# Patient Record
Sex: FEMALE | Race: WHITE
[De-identification: names, ages, dates, MRNs, and addresses within clinical notes are randomized per-mention and may not be internally consistent; named-entity substitution may affect disease eponyms.]

---

## 2021-02-16 ENCOUNTER — APPOINTMENT (OUTPATIENT)
Dept: PLASTIC SURGERY | Facility: CLINIC | Age: 51
End: 2021-02-16
Payer: SELF-PAY

## 2021-02-16 VITALS
HEART RATE: 74 BPM | TEMPERATURE: 97.9 F | DIASTOLIC BLOOD PRESSURE: 76 MMHG | RESPIRATION RATE: 19 BRPM | OXYGEN SATURATION: 100 % | SYSTOLIC BLOOD PRESSURE: 119 MMHG

## 2021-02-16 VITALS — BODY MASS INDEX: 24.99 KG/M2 | HEIGHT: 65 IN | WEIGHT: 150 LBS

## 2021-02-16 DIAGNOSIS — L98.8 OTHER SPECIFIED DISORDERS OF THE SKIN AND SUBCUTANEOUS TISSUE: ICD-10-CM

## 2021-02-16 PROBLEM — Z00.00 ENCOUNTER FOR PREVENTIVE HEALTH EXAMINATION: Status: ACTIVE | Noted: 2021-02-16

## 2021-02-16 PROCEDURE — 11950 SUBQ NJX FILLING MATRL 1CC/<: CPT | Mod: 59

## 2021-03-02 ENCOUNTER — APPOINTMENT (OUTPATIENT)
Dept: PLASTIC SURGERY | Facility: CLINIC | Age: 51
End: 2021-03-02
Payer: SELF-PAY

## 2021-03-02 DIAGNOSIS — L90.8 OTHER ATROPHIC DISORDERS OF SKIN: ICD-10-CM

## 2021-03-02 PROCEDURE — 11950 SUBQ NJX FILLING MATRL 1CC/<: CPT

## 2021-03-02 NOTE — ASSESSMENT
[FreeTextEntry1] : THOMAS BLANCHARD  was here for procedural Botox and voluma  injection.  She  tolerated the procedure well and will return for next dose in 4-5 months.  Instructions were reviewed. pt also considering photo facials for pigment under eyes \par

## 2021-04-28 PROBLEM — L98.8 FACIAL RHYTIDS: Status: ACTIVE | Noted: 2021-03-02

## 2021-04-28 NOTE — ASSESSMENT
[FreeTextEntry1] : THOMAS BLANCHARD  was here for procedural Botox and voluma  injection.  She  tolerated the procedure well and will return for next dose in 4-5 months.  Instructions were reviewed. Aseptic administration of botox and voluma to indicated areas of the face.  See external sheet for lot and dose information \par

## 2021-04-28 NOTE — PROCEDURE
[FreeTextEntry6] : THOMAS BLANCHARD is complaining of dynamic rhytids of the face and desires botulinum toxin and voluma for temporary reduction in these rhytids.  The risks benefits alternatives limitations and permanent scars were outlined with her . Under aseptic conditions, Botulinum Toxin was administered in the desired area. Please see face sheet for lot , site and dose information. \par \par Please see the scanned face sheet for lot and dose information.\par

## 2022-07-14 ENCOUNTER — NON-APPOINTMENT (OUTPATIENT)
Age: 52
End: 2022-07-14

## 2022-07-19 ENCOUNTER — APPOINTMENT (OUTPATIENT)
Dept: BREAST CENTER | Facility: CLINIC | Age: 52
End: 2022-07-19

## 2022-07-19 VITALS
DIASTOLIC BLOOD PRESSURE: 78 MMHG | BODY MASS INDEX: 24.16 KG/M2 | WEIGHT: 145 LBS | HEART RATE: 66 BPM | SYSTOLIC BLOOD PRESSURE: 113 MMHG | HEIGHT: 65 IN

## 2022-07-19 DIAGNOSIS — Z78.9 OTHER SPECIFIED HEALTH STATUS: ICD-10-CM

## 2022-07-19 DIAGNOSIS — R92.2 INCONCLUSIVE MAMMOGRAM: ICD-10-CM

## 2022-07-19 DIAGNOSIS — Z80.42 FAMILY HISTORY OF MALIGNANT NEOPLASM OF PROSTATE: ICD-10-CM

## 2022-07-19 DIAGNOSIS — Z87.891 PERSONAL HISTORY OF NICOTINE DEPENDENCE: ICD-10-CM

## 2022-07-19 DIAGNOSIS — Z12.31 ENCOUNTER FOR SCREENING MAMMOGRAM FOR MALIGNANT NEOPLASM OF BREAST: ICD-10-CM

## 2022-07-19 DIAGNOSIS — N64.4 MASTODYNIA: ICD-10-CM

## 2022-07-19 DIAGNOSIS — Z80.1 FAMILY HISTORY OF MALIGNANT NEOPLASM OF TRACHEA, BRONCHUS AND LUNG: ICD-10-CM

## 2022-07-19 PROCEDURE — 99204 OFFICE O/P NEW MOD 45 MIN: CPT

## 2022-07-19 RX ORDER — CHLORHEXIDINE GLUCONATE 4 %
LIQUID (ML) TOPICAL
Refills: 0 | Status: ACTIVE | COMMUNITY

## 2022-07-19 RX ORDER — TRETINOIN 0.5 MG/G
0.05 CREAM TOPICAL
Qty: 20 | Refills: 0 | Status: ACTIVE | COMMUNITY
Start: 2022-06-09

## 2022-07-19 RX ORDER — ZINC SULFATE 50(220)MG
CAPSULE ORAL
Refills: 0 | Status: ACTIVE | COMMUNITY

## 2022-07-19 RX ORDER — POLYPODIUM LEUCOTOMOS EXTRACT 240 MG
CAPSULE ORAL
Refills: 0 | Status: ACTIVE | COMMUNITY

## 2022-07-19 RX ORDER — CHROMIUM 200 MCG
TABLET ORAL
Refills: 0 | Status: ACTIVE | COMMUNITY

## 2022-07-19 RX ORDER — SERTRALINE HYDROCHLORIDE 100 MG/1
100 TABLET, FILM COATED ORAL
Qty: 180 | Refills: 0 | Status: ACTIVE | COMMUNITY
Start: 2022-07-01

## 2022-07-19 NOTE — REVIEW OF SYSTEMS
[Chills] : chills [Nasal Discharge] : nasal discharge [Sore Throat] : sore throat [Cough] : cough [Negative] : Heme/Lymph [Feeling Tired] : not feeling tired [FreeTextEntry2] : Night sweats [FreeTextEntry9] : Joint pain [FreeTextEntry1] : Low back pain

## 2022-07-19 NOTE — HISTORY OF PRESENT ILLNESS
[FreeTextEntry1] : This is a 53 yo female who presents today with complaints of right achy sore breast pain that has been ongoing for about 3 weeks. Patient fractured her right rib about 2 weeks ago from slipping coming out of a hot tub, however her breast pain started before that. Patient does take multiple supplements, however no major significant changes in her supplements. No significant caffeine intake - drinks only 1 cup of caffeinated coffee daily. Patient states she has difficulty managing her stress. \par \par Patient underwent bilateral screening mammograms/ultrasounds on 08/20/21 which revealed extremely dense breasts, however no dominant mass, suspicious microcalcifications or architectural distortion was identified. A few scattered cysts were noted measuring up to 0.6 x 0.4 x 0.6 cm in the right breast at 2:00 N1. Patient also underwent a right breast US on 06/27/22 which was negative for any suspicious masses, cysts, or abnormalities identified in the upper outer quadrant. Patient is s/p bilateral mastopexy by Dr. Srinivasan on 12/03/15 to even out the asymmetric breast size since her right breast was greater than her left breast preoperatively. \par \par She does SBE.\par She has not noticed a change in her breast or a breast lump.\par She has not noticed a change in her nipple or nipple area.\par She has not noticed a change in the skin of the breast.\par She is not experiencing nipple discharge.\par She is not experiencing breast pain.\par She has not noticed a lump or lymph node under the armpit.\par \par BREAST CANCER RISK FACTORS\par Menarche: 12\par Date of LMP: unknown\par Menopause: Yes at age 51 \par Grav:  4    Para: 3\par Age at first live birth: 29 \par Nursed: Yes \par Hysterectomy: N \par Oophorectomy: N \par OCP: Y in the past for 3-4 years. Mirena IUD user for 18 years\par HRT: No\par Last pap/pelvic exam: 4/2022 WNL \par Related family history: none\par Ashkenazi: Yes \par Mastery risk assessment: BRCAPRO 12.1%, TCv7 11.4%, TCv8 16.7%, Jolanta 12.2%, Calus unavailable\par BRCA testing: negative tested 2017\par Bra size: 36C\par  \par Last mammogram: 08/20/21                 Location: [facility]\par Report reviewed.                                 Images reviewed.\par Results: BI-RADS 2 - Extremely dense breast (D). No dominant mass, suspicious microcalcifications or architectural distortion identified. \par \par Last ultrasound: 06/27/22                    Location: [facility]\par Report reviewed.                                 Images reviewed.\par Results: Right breast - BI-RADS 1 - no mass or cyst. No abnormality is identified in the upper outer quadrant. \par \par Last MRI: Never                                             Location:\par Report reviewed. \par Results:

## 2022-07-19 NOTE — ASSESSMENT
[FreeTextEntry1] : Patient is a 53 yo female with right breast pain\par Patient is not high risk\par recommend right diagnostic mammo, left screening mammo, Left breast US (just had right breast US)\par If imaging is negative - consider MRI, reviewed pros/cons of MRI\par Discussed with patient keeping a diary of her pain and possible triggers\par \par Reviewed common etiologies of breast pain including caffeine, hormones and stress\par \par We reviewed risk reduction strategies including maintaining a BMI <25, limiting red meat intake and alcoholic beverages to 3 per week and exercise (150 min/ week low intensity or 75 min/week high intensity). And maintaining a normal vitamin D level.\par \par She knows to call or return sooner should any concerns or questions arise.\par \par

## 2022-07-19 NOTE — CONSULT LETTER
[Dear  ___] : Dear  [unfilled], [( Thank you for referring [unfilled] for consultation for _____ )] : Thank you for referring [unfilled] for consultation for [unfilled] [Please see my note below.] : Please see my note below. [Consult Closing:] : Thank you very much for allowing me to participate in the care of this patient.  If you have any questions, please do not hesitate to contact me. [Sincerely,] : Sincerely, [DrRobert  ___] : Dr. MARRERO [FreeTextEntry3] : Emy Sanchez MS DO\par Breast Surgeon\par [facility] \[place] [address]\par

## 2022-07-19 NOTE — PHYSICAL EXAM
[Normocephalic] : normocephalic [Atraumatic] : atraumatic [Supple] : supple [No Supraclavicular Adenopathy] : no supraclavicular adenopathy [Examined in the supine and seated position] : examined in the supine and seated position [Symmetrical] : symmetrical [No dominant masses] : no dominant masses left breast [No Nipple Retraction] : no left nipple retraction [No Nipple Discharge] : no left nipple discharge [No Axillary Lymphadenopathy] : no left axillary lymphadenopathy [No Edema] : no edema [No Rashes] : no rashes [No Ulceration] : no ulceration [de-identified] : s/p right reduction and left mastopexy [de-identified] : there is lumpiness UOQ and 6:00 and IMF, no discrete lesion noted, no skin changes [de-identified] : there is FGC UOQ, healing incisions c/d/i

## 2022-07-26 ENCOUNTER — NON-APPOINTMENT (OUTPATIENT)
Age: 52
End: 2022-07-26

## 2023-09-28 ENCOUNTER — APPOINTMENT (OUTPATIENT)
Dept: GYNECOLOGIC ONCOLOGY | Facility: CLINIC | Age: 53
End: 2023-09-28
Payer: COMMERCIAL

## 2023-09-28 DIAGNOSIS — Z80.41 FAMILY HISTORY OF MALIGNANT NEOPLASM OF OVARY: ICD-10-CM

## 2023-09-28 PROCEDURE — 99204 OFFICE O/P NEW MOD 45 MIN: CPT
